# Patient Record
Sex: FEMALE | Race: WHITE
[De-identification: names, ages, dates, MRNs, and addresses within clinical notes are randomized per-mention and may not be internally consistent; named-entity substitution may affect disease eponyms.]

---

## 2017-01-26 ENCOUNTER — HOSPITAL ENCOUNTER (OUTPATIENT)
Dept: HOSPITAL 45 - C.MAMM | Age: 52
Discharge: HOME | End: 2017-01-26
Attending: INTERNAL MEDICINE
Payer: COMMERCIAL

## 2017-01-26 ENCOUNTER — HOSPITAL ENCOUNTER (OUTPATIENT)
Dept: HOSPITAL 45 - C.LABBC | Age: 52
Discharge: HOME | End: 2017-01-26
Attending: INTERNAL MEDICINE
Payer: COMMERCIAL

## 2017-01-26 DIAGNOSIS — J44.9: Primary | ICD-10-CM

## 2017-01-26 DIAGNOSIS — Z12.31: Primary | ICD-10-CM

## 2017-01-26 DIAGNOSIS — E55.9: ICD-10-CM

## 2017-01-26 LAB
ALBUMIN/GLOB SERPL: 0.9 {RATIO} (ref 0.9–2)
ALP SERPL-CCNC: 69 U/L (ref 45–117)
ALT SERPL-CCNC: 17 U/L (ref 12–78)
ANION GAP SERPL CALC-SCNC: 8 MMOL/L (ref 3–11)
AST SERPL-CCNC: 12 U/L (ref 15–37)
BUN SERPL-MCNC: 12 MG/DL (ref 7–18)
BUN/CREAT SERPL: 16.3 (ref 10–20)
CALCIUM SERPL-MCNC: 9.4 MG/DL (ref 8.5–10.1)
CHLORIDE SERPL-SCNC: 106 MMOL/L (ref 98–107)
CHOLEST/HDLC SERPL: 3.6 {RATIO}
CO2 SERPL-SCNC: 27 MMOL/L (ref 21–32)
CREAT SERPL-MCNC: 0.76 MG/DL (ref 0.6–1.2)
GLOBULIN SER-MCNC: 4.3 GM/DL (ref 2.5–4)
GLUCOSE SERPL-MCNC: 94 MG/DL (ref 70–99)
GLUCOSE UR QL: 58 MG/DL
KETONES UR QL STRIP: 129 MG/DL
NITRITE UR QL STRIP: 98 MG/DL (ref 0–150)
PH UR: 207 MG/DL (ref 0–200)
POTASSIUM SERPL-SCNC: 4.1 MMOL/L (ref 3.5–5.1)
SODIUM SERPL-SCNC: 141 MMOL/L (ref 136–145)
TSH SERPL-ACNC: 1.35 UIU/ML (ref 0.3–4.5)
VERY LOW DENSITY LIPOPROT CALC: 20 MG/DL

## 2017-01-27 NOTE — MAMMOGRAPHY REPORT
BILATERAL DIGITAL SCREENING MAMMOGRAM TOMOSYNTHESIS WITH CAD: 1/26/2017



TECHNIQUE:  Breast tomosynthesis in addition to standard 2D mammography was performed. Current study
 was also evaluated with a Computer Aided Detection (CAD) system.  



COMPARISON: Prior outside mammograms from [facility] dated 4/10/2015, 4/9/2014, 4/5/2013,
 4/3/2012.



BREAST COMPOSITION:  The tissue of both breasts is heterogeneously dense, which may obscure small ma
sses.  



FINDINGS:  No suspicious masses, calcifications, or areas of architectural distortion are noted in [facility]. There has been no significant interval change compared to prior exams.  





IMPRESSION:  ACR BI-RADS CATEGORY 1: NEGATIVE

There is no mammographic evidence of malignancy. A 1 year screening mammogram is recommended.  The p
atient will receive written notification of the results.  





Approximately 10% of breast cancers are not detected with mammography. A negative mammographic repor
t should not delay biopsy if a clinically suggestive mass is present.



Eulalia Gomez M.D.          

ah/:1/27/2017 15:06:45  



Imaging Technologist: Allie MOLINA(R)(M), [facility]

letter sent: Normal 1/2  

BI-RADS Code: ACR BI-RADS Category 1: Negative

## 2017-01-31 NOTE — CODING QUERY MEDICAL NECESSITY
SUPPORTING DIAGNOSIS NEEDED





A supporting diagnosis is required for the test/procedure performed on this patient in 
order for us to be reimbursed by the patient's insurance. Please provide a supporting 
diagnosis for the following test/procedure listed below next to the test name along with 
your signature. 



*If there is no additional diagnosis for this patient that would support the following 
test/procedure please document that below next to the test/procedure.



Test(s)/Procedure(s) that require a supporting diagnosis:







* VITAMIN D, 25-HYDROXY                      DIAGNOSIS:







Provider Signature:  ______________________________  Date:  _______



Thank you  

Lela Stallings

[employer]

Phone:  [phone]

Fax:  [phone]



Once completed, please kindly fax back to [phone]



For questions please call [phone]

## 2017-04-07 ENCOUNTER — HOSPITAL ENCOUNTER (OUTPATIENT)
Dept: HOSPITAL 45 - C.PAPS | Age: 52
Discharge: HOME | End: 2017-04-07
Attending: OBSTETRICS & GYNECOLOGY
Payer: COMMERCIAL

## 2017-04-07 DIAGNOSIS — Z01.419: Primary | ICD-10-CM

## 2017-04-19 ENCOUNTER — HOSPITAL ENCOUNTER (OUTPATIENT)
Dept: HOSPITAL 45 - C.LABBC | Age: 52
Discharge: HOME | End: 2017-04-19
Attending: OBSTETRICS & GYNECOLOGY
Payer: COMMERCIAL

## 2017-04-19 DIAGNOSIS — E22.1: Primary | ICD-10-CM

## 2018-01-26 ENCOUNTER — HOSPITAL ENCOUNTER (OUTPATIENT)
Dept: HOSPITAL 45 - C.ULTRBC | Age: 53
End: 2018-01-26
Attending: INTERNAL MEDICINE
Payer: COMMERCIAL

## 2018-01-26 DIAGNOSIS — E06.3: Primary | ICD-10-CM

## 2018-01-26 NOTE — DIAGNOSTIC IMAGING REPORT
ULTRASOUND OF THE THYROID GLAND



CLINICAL HISTORY: Hashimoto's thyroiditis.



COMPARISON STUDY: Nuclear thyroid scan dated 5/25/2011.



TECHNIQUE: Real-time, grayscale, and color flow sonography of the thyroid gland

is performed utilizing a high-frequency linear transducer. Images are reviewed

in the transverse and longitudinal planes.



FINDINGS:



Right lobe: The right lobe of the thyroid gland is normal in size and slightly

heterogeneous in echotexture, measuring 5.7 x 1.3 x 1.3 cm. No hyperemia is

identified on color imaging. A predominantly cystic nodule in the lower pole

measures 1.8 x 1.1 x 1.6 cm. This contains foci of internal colloid versus

calcification.



Left lobe: The left lobe of the thyroid gland is normal in size and slightly

heterogeneous in echotexture, measuring 5.3 x 1.1 x 1.2 cm. No hyperemia is

identified on color imaging.



Isthmus: The thyroid isthmus appears thickened and measures 0.7 cm in AP

diameter. A hypoechoic nodule in the isthmus measures 1.9 x 0.7 x 1.4 cm.





IMPRESSION: 



1. The thyroid gland is normal in size and slightly heterogeneous in

echotexture. No hyperemia is identified on color imaging.



2. There is a predominantly cystic nodule in the right lower pole measuring up

to 1.8 cm. This contains foci of internal calcification versus colloid.

Fine-needle aspiration is recommended based on morphologic criteria.







Electronically signed by:  Chase Finnegan M.D.

1/26/2018 9:40 AM



Dictated Date/Time:  1/26/2018 9:37 AM

## 2018-01-31 ENCOUNTER — HOSPITAL ENCOUNTER (OUTPATIENT)
Dept: HOSPITAL 45 - C.LABBC | Age: 53
Discharge: HOME | End: 2018-01-31
Attending: INTERNAL MEDICINE
Payer: COMMERCIAL

## 2018-01-31 DIAGNOSIS — E55.9: ICD-10-CM

## 2018-01-31 DIAGNOSIS — E06.3: ICD-10-CM

## 2018-01-31 DIAGNOSIS — E03.9: ICD-10-CM

## 2018-01-31 DIAGNOSIS — Z13.220: ICD-10-CM

## 2018-01-31 DIAGNOSIS — Z00.00: Primary | ICD-10-CM

## 2018-01-31 DIAGNOSIS — N95.1: ICD-10-CM

## 2018-01-31 LAB
ALBUMIN SERPL-MCNC: 3.9 GM/DL (ref 3.4–5)
ALP SERPL-CCNC: 72 U/L (ref 45–117)
ALT SERPL-CCNC: 19 U/L (ref 12–78)
AST SERPL-CCNC: 13 U/L (ref 15–37)
BASOPHILS # BLD: 0.05 K/UL (ref 0–0.2)
BASOPHILS NFR BLD: 0.8 %
BUN SERPL-MCNC: 14 MG/DL (ref 7–18)
CALCIUM SERPL-MCNC: 9.4 MG/DL (ref 8.5–10.1)
CO2 SERPL-SCNC: 29 MMOL/L (ref 21–32)
CREAT SERPL-MCNC: 0.75 MG/DL (ref 0.6–1.2)
EOS ABS #: 0.22 K/UL (ref 0–0.5)
EOSINOPHIL NFR BLD AUTO: 295 K/UL (ref 130–400)
GLUCOSE SERPL-MCNC: 94 MG/DL (ref 70–99)
HCT VFR BLD CALC: 40 % (ref 37–47)
HGB BLD-MCNC: 13.7 G/DL (ref 12–16)
IG#: 0.01 K/UL (ref 0–0.02)
IMM GRANULOCYTES NFR BLD AUTO: 42.8 %
KETONES UR QL STRIP: 129 MG/DL
LYMPHOCYTES # BLD: 2.74 K/UL (ref 1.2–3.4)
MCH RBC QN AUTO: 29.9 PG (ref 25–34)
MCHC RBC AUTO-ENTMCNC: 34.3 G/DL (ref 32–36)
MCV RBC AUTO: 87.3 FL (ref 80–100)
MONO ABS #: 0.34 K/UL (ref 0.11–0.59)
MONOCYTES NFR BLD: 5.3 %
NEUT ABS #: 3.04 K/UL (ref 1.4–6.5)
NEUTROPHILS # BLD AUTO: 3.4 %
NEUTROPHILS NFR BLD AUTO: 47.5 %
PH UR: 223 MG/DL (ref 0–200)
PMV BLD AUTO: 9.6 FL (ref 7.4–10.4)
POTASSIUM SERPL-SCNC: 3.9 MMOL/L (ref 3.5–5.1)
PROT SERPL-MCNC: 8.3 GM/DL (ref 6.4–8.2)
RED CELL DISTRIBUTION WIDTH CV: 13.7 % (ref 11.5–14.5)
RED CELL DISTRIBUTION WIDTH SD: 43.5 FL (ref 36.4–46.3)
SODIUM SERPL-SCNC: 139 MMOL/L (ref 136–145)
WBC # BLD AUTO: 6.4 K/UL (ref 4.8–10.8)

## 2018-02-12 ENCOUNTER — HOSPITAL ENCOUNTER (OUTPATIENT)
Dept: HOSPITAL 45 - C.ULTR | Age: 53
Discharge: HOME | End: 2018-02-12
Attending: INTERNAL MEDICINE
Payer: COMMERCIAL

## 2018-02-12 DIAGNOSIS — E04.1: ICD-10-CM

## 2018-02-12 DIAGNOSIS — E06.3: ICD-10-CM

## 2018-02-12 DIAGNOSIS — E03.9: Primary | ICD-10-CM

## 2018-02-12 NOTE — DIAGNOSTIC IMAGING REPORT
ULTRASOUND-GUIDED FINE-NEEDLE ASPIRATION OF A  RIGHT THYROID NODULE



HISTORY: Right thyroid nodule.    



COMPARISON:  Thyroid ultrasound 1/26/2018.



PROCEDURE: Written informed consent was obtained. The neck was prepped and

draped in the usual sterile fashion. 1% lidocaine was used for local anesthesia.

A total of 3 passes using a 25-gauge needle were made through the 1.6 cm right

thyroid nodule under ultrasound guidance. Specimens were given to the on-site

pathologist who determined adequate tissue for diagnosis. The patient tolerated

the procedure well. There were no immediate complications. 



IMPRESSION:  

Successful ultrasound-guided fine-needle aspiration of a right thyroid nodule. 







Electronically signed by:  Jose Luis De La Cruz M.D.

2/12/2018 12:05 PM



Dictated Date/Time:  2/12/2018 12:05 PM